# Patient Record
Sex: MALE | Race: OTHER | HISPANIC OR LATINO | URBAN - METROPOLITAN AREA
[De-identification: names, ages, dates, MRNs, and addresses within clinical notes are randomized per-mention and may not be internally consistent; named-entity substitution may affect disease eponyms.]

---

## 2018-06-22 ENCOUNTER — EMERGENCY (EMERGENCY)
Facility: HOSPITAL | Age: 33
LOS: 1 days | Discharge: HOME | End: 2018-06-22
Admitting: EMERGENCY MEDICINE
Payer: COMMERCIAL

## 2018-06-22 VITALS
SYSTOLIC BLOOD PRESSURE: 123 MMHG | DIASTOLIC BLOOD PRESSURE: 77 MMHG | WEIGHT: 160.06 LBS | TEMPERATURE: 98 F | RESPIRATION RATE: 16 BRPM | HEART RATE: 72 BPM | OXYGEN SATURATION: 99 %

## 2018-06-22 DIAGNOSIS — R51 HEADACHE: ICD-10-CM

## 2018-06-22 DIAGNOSIS — J03.90 ACUTE TONSILLITIS, UNSPECIFIED: ICD-10-CM

## 2018-06-22 LAB — S PYO AG SPEC QL IA: NEGATIVE — SIGNIFICANT CHANGE UP

## 2018-06-22 PROCEDURE — 99283 EMERGENCY DEPT VISIT LOW MDM: CPT

## 2018-06-22 RX ORDER — IBUPROFEN 200 MG
800 TABLET ORAL ONCE
Qty: 0 | Refills: 0 | Status: COMPLETED | OUTPATIENT
Start: 2018-06-22 | End: 2018-06-22

## 2018-06-22 RX ORDER — PENICILLIN V POTASSIUM 250 MG
1 TABLET ORAL
Qty: 20 | Refills: 0 | OUTPATIENT
Start: 2018-06-22 | End: 2018-07-01

## 2018-06-22 RX ORDER — DEXAMETHASONE 0.5 MG/5ML
8 ELIXIR ORAL ONCE
Qty: 0 | Refills: 0 | Status: COMPLETED | OUTPATIENT
Start: 2018-06-22 | End: 2018-06-22

## 2018-06-22 RX ADMIN — Medication 800 MILLIGRAM(S): at 16:26

## 2018-06-22 RX ADMIN — Medication 8 MILLIGRAM(S): at 16:25

## 2018-06-22 NOTE — ED PROVIDER NOTE - OBJECTIVE STATEMENT
31 y/o M presents w/ generalized body aches, headache, sore throat, x2 days, persistent, and today, patient felt feverish so presents for evaluation. Denies cough, chest pain, shortness of breath, abdominal pain, n/v/d, rash, or any other complaints. He reports +sick contact.

## 2018-06-22 NOTE — ED PROVIDER NOTE - PHYSICAL EXAMINATION
VITAL SIGNS: I have reviewed nursing notes and confirm.  CONSTITUTIONAL: Well-developed; well-nourished; in no acute distress.  SKIN: Skin is warm and dry, no acute rash.  HEAD: Normocephalic; atraumatic.  EYES: PERRL, EOM intact; conjunctiva and sclera clear.  ENT: No nasal discharge; airway clear  THROAT: +1 tonsils, bilateral erythema and exudates  NECK: Supple; non tender.  CARD: S1, S2 normal; no murmurs, gallops, or rubs. Regular rate and rhythm.  RESP: No wheezes, rales or rhonchi.  ABD: Normal bowel sounds; soft; non-distended; non-tender; no hepatosplenomegaly.  EXT: Normal ROM. No clubbing, cyanosis or edema.  NEURO: Alert, oriented. Grossly unremarkable.  PSYCH: Cooperative, appropriate.

## 2018-06-22 NOTE — ED ADULT TRIAGE NOTE - CHIEF COMPLAINT QUOTE
c/o generalized body aches, intermittent cough and subjective fever x3days. unk sick contact, denies any recent travel. well appearing, nad.

## 2018-06-22 NOTE — ED PROVIDER NOTE - MEDICAL DECISION MAKING DETAILS
P/w sore throat x2 days w/ associated headache, body aches and tactile fever. Afebrile here, VSS. Will send throat swab to r/o strep and treat appropriately. P/w sore throat x2 days w/ associated headache, body aches and tactile fever. Afebrile here, VSS. Will send throat swab to r/o strep, treat symptomatically with 800 mg Ibuprofen and decadron, reassess.

## 2018-06-22 NOTE — ED PROVIDER NOTE - NS ED ROS FT
Denies nausea, vomiting, diarrhea, constipation, abdominal pain, urinary symptoms, chest pain, palpitations, shortness of breath, dyspnea on exertion, syncope/near syncope, cough/URI symptoms, weakness, numbness, focal deficits, visual changes, gait or balance changes, dizziness

## 2018-06-24 LAB
CULTURE RESULTS: SIGNIFICANT CHANGE UP
SPECIMEN SOURCE: SIGNIFICANT CHANGE UP

## 2019-07-23 NOTE — ED ADULT NURSE NOTE - PATIENT DISCHARGE SIGNATURE
Detail Level: Zone Hide Include Location In Plan Question?: No Include Location In Plan?: Yes 22-Jun-2018